# Patient Record
Sex: FEMALE | URBAN - METROPOLITAN AREA
[De-identification: names, ages, dates, MRNs, and addresses within clinical notes are randomized per-mention and may not be internally consistent; named-entity substitution may affect disease eponyms.]

---

## 2022-12-14 ENCOUNTER — CLAIMS CREATED FROM THE CLAIM WINDOW (OUTPATIENT)
Dept: URBAN - METROPOLITAN AREA CLINIC 98 | Facility: CLINIC | Age: 37
End: 2022-12-14
Payer: COMMERCIAL

## 2022-12-14 VITALS
HEIGHT: 61 IN | WEIGHT: 185.8 LBS | DIASTOLIC BLOOD PRESSURE: 102 MMHG | TEMPERATURE: 98.2 F | HEART RATE: 120 BPM | BODY MASS INDEX: 35.08 KG/M2 | SYSTOLIC BLOOD PRESSURE: 114 MMHG

## 2022-12-14 DIAGNOSIS — K76.9 LIVER LESION: ICD-10-CM

## 2022-12-14 DIAGNOSIS — R10.11 RIGHT UPPER QUADRANT PAIN: ICD-10-CM

## 2022-12-14 PROBLEM — 300331000: Status: ACTIVE | Noted: 2022-12-14

## 2022-12-14 PROCEDURE — 99204 OFFICE O/P NEW MOD 45 MIN: CPT | Performed by: INTERNAL MEDICINE

## 2022-12-14 RX ORDER — LEVOTHYROXINE SODIUM 175 UG/1
1 TABLET IN THE MORNING ON AN EMPTY STOMACH TABLET ORAL ONCE A DAY
Status: ACTIVE | COMMUNITY

## 2022-12-14 NOTE — HPI-TODAY'S VISIT:
Ms. Boyle is a 38 yo Georgian-speaking F presenting for new patient visit for abdominal pain.  An  is used for this encounter. ID [id number]   She was seen a a hospital in Feb 2022 for right upper quadrant pain that radiated to her back. She believes she had her gallbladder removed at that time.  She feels her pain worsened after the gallbladder was removed.  Recently was seen at 2 EDs for the pain. CT showed lesion in gallbladder and liver cysts, ultrasound did not show a lesion in gallbladder but recommended MRI.   She has a burning RUQ pain with associated nausea, no vomiting.  No weight los or blood in stool.  No medications help her symptoms. she has not had fever or yellowing of skin or eyes She was given pantoprazole, dicyclomine, zofran, docusate in the hospital  I reviewed: 11/1/22:  H/H 14.1/41 T bili 0.5 AST 28 ALT 38  11/17/22 labs:  alk phos 52 AST 21, ALT 24, t bili 0.6 lipase 38  11/17/22:  Ultrasound: hypoechoic areas in right aspec of liver 3.2x 2.7x2.8 cm, 3.3 x 4.3 x 3.4 cm. Questioned gallbladder calcification seen on CT from same day not identified 12x7 mm ovoid hypodense structure near infundibulum- partially calcified gallstone, aneurysm. 32 mm cyst abutting gallbladder fossa

## 2022-12-14 NOTE — EXAM-FUNCTIONAL ASSESSMENT
Constitutional: well-developed, normal communication ability.   Eyes: Conjunctivae and eyelids appear normal, no scleral icterus. Respiratory: symmetric expansion of chest wall, normal work of breathing   Gastrointestinal:  normoactive bowel sounds, soft, generalized abdominal tenderness, no rebound tenderness, no shifting dullness, no organomegaly.   Musculoskeletal: normal gait and station   Skin: no jaundice   Neurologic: Oriented to person, place, time. Short term memory intact.    Psychiatric: Normal mood and appropriate affect.

## 2022-12-15 LAB — AFP, SERUM, TUMOR MARKER: <1.8

## 2023-01-07 ENCOUNTER — LAB OUTSIDE AN ENCOUNTER (OUTPATIENT)
Dept: URBAN - METROPOLITAN AREA CLINIC 98 | Facility: CLINIC | Age: 38
End: 2023-01-07

## 2023-01-17 ENCOUNTER — DASHBOARD ENCOUNTERS (OUTPATIENT)
Age: 38
End: 2023-01-17

## 2023-01-17 ENCOUNTER — OFFICE VISIT (OUTPATIENT)
Dept: URBAN - METROPOLITAN AREA CLINIC 98 | Facility: CLINIC | Age: 38
End: 2023-01-17
Payer: COMMERCIAL

## 2023-01-17 VITALS
SYSTOLIC BLOOD PRESSURE: 118 MMHG | BODY MASS INDEX: 36.99 KG/M2 | HEART RATE: 85 BPM | DIASTOLIC BLOOD PRESSURE: 70 MMHG | WEIGHT: 188.4 LBS | HEIGHT: 60 IN | TEMPERATURE: 97.9 F

## 2023-01-17 DIAGNOSIS — K80.20 CALCULUS OF GALLBLADDER WITHOUT CHOLECYSTITIS WITHOUT OBSTRUCTION: ICD-10-CM

## 2023-01-17 DIAGNOSIS — R10.11 RUQ ABDOMINAL PAIN: ICD-10-CM

## 2023-01-17 DIAGNOSIS — E66.9 OBESITY (BMI 30.0-34.9): ICD-10-CM

## 2023-01-17 DIAGNOSIS — R93.2 ABNORMAL CT OF LIVER: ICD-10-CM

## 2023-01-17 DIAGNOSIS — K76.0 NAFLD (NONALCOHOLIC FATTY LIVER DISEASE): ICD-10-CM

## 2023-01-17 DIAGNOSIS — K76.89 LIVER CYST: ICD-10-CM

## 2023-01-17 DIAGNOSIS — K21.9 GERD WITHOUT ESOPHAGITIS: ICD-10-CM

## 2023-01-17 PROBLEM — 85057007: Status: ACTIVE | Noted: 2023-01-17

## 2023-01-17 PROBLEM — 70342003: Status: ACTIVE | Noted: 2023-01-17

## 2023-01-17 PROBLEM — 1231824009: Status: ACTIVE | Noted: 2023-01-17

## 2023-01-17 PROBLEM — 443371000124107: Status: ACTIVE | Noted: 2023-01-17

## 2023-01-17 PROCEDURE — 99214 OFFICE O/P EST MOD 30 MIN: CPT | Performed by: INTERNAL MEDICINE

## 2023-01-17 RX ORDER — PANTOPRAZOLE SODIUM 40 MG/1
1 TABLET TABLET, DELAYED RELEASE ORAL ONCE A DAY
Qty: 30 | Refills: 3 | OUTPATIENT
Start: 2023-01-17

## 2023-01-17 RX ORDER — DICYCLOMINE HYDROCHLORIDE 20 MG/1
1 TABLET TABLET ORAL THREE TIMES A DAY
Qty: 30 TABLET | Refills: 1 | OUTPATIENT
Start: 2023-01-17 | End: 2023-02-06

## 2023-01-17 RX ORDER — LEVOTHYROXINE SODIUM 175 UG/1
1 TABLET IN THE MORNING ON AN EMPTY STOMACH TABLET ORAL ONCE A DAY
Status: ACTIVE | COMMUNITY

## 2023-01-17 NOTE — HPI-TODAY'S VISIT:
36 yo pt here for follow up of abdominal pain. She reports beng seen in the ER @ [facility] in 2/22: she reports having CCY at that time, no Hospital records available for reviewing. Since surgery, her RUQ abdominal pain has been more intense and frequent compared to prior to her CCY: pain is rather constant in RUQ, w nocturnal pressure " like a balloon " in the RUQ, w radiation to the lower back, exacerbated by eating, unrelated to the type of food she eats, w concomitant nausea and no emesis. She also reports burning epigastric pain w intermittent heartburn, regurgitation, gas and burping. Chronic alternating loose, non-bloody stools and mild constipation wo melenic stools nor hematochezia. No anorexia or weight loss and o fever, chills nor night sweats. She was seen by Dr. Cook: AFP normal. MRI 1/23: fatty liver, focally dilated bile ducts wo intrahepatic biliary dilatation, gallstones w sludge, and 2.5 cm complex liver cyst. She has had no dysphagia / odynophagia. Mild urinary frequency lately wo hematuria or dysuria.  No COVID-19 exposure and has received COVID-19 vaccine. NO other complaints.

## 2023-01-17 NOTE — PHYSICAL EXAM GASTROINTESTINAL
Abdomen , obese abdomen, soft, nontender, nondistended , no guarding or rigidity , no masses palpable , normal bowel sounds , Liver and Spleen , no hepatomegaly present , no hepatosplenomegaly , liver nontender , spleen not palpable

## 2023-01-24 ENCOUNTER — TELEPHONE ENCOUNTER (OUTPATIENT)
Dept: URBAN - METROPOLITAN AREA CLINIC 98 | Facility: CLINIC | Age: 38
End: 2023-01-24

## 2023-01-26 LAB
A/G RATIO: 1.6
ALBUMIN: 4.5
ALKALINE PHOSPHATASE: 62
ALPHA 2-MACROGLOBULINS, QN: 149
ALT (SGPT) P5P: 28
ALT (SGPT): 24
ANCA BY IFA (RDL): NEGATIVE
APOLIPOPROTEIN A-1: 157
AST (SGOT) P5P: 24
AST (SGOT): 20
BILIRUBIN, TOTAL: 0.3
BILIRUBIN, TOTAL: 0.3
BUN/CREATININE RATIO: 15
BUN: 14
CALCIUM: 9.3
CARBON DIOXIDE, TOTAL: 20
CHLORIDE: 105
CHOLESTEROL, TOTAL: 158
CHOLESTEROL, TOTAL: 162
COMMENT:: (no result)
COMMENT:: (no result)
CREATININE: 0.93
EGFR: 80
FIBROSIS SCORE: 0.05
FIBROSIS SCORING:: (no result)
FIBROSIS STAGE: (no result)
GGT: 34
GLOBULIN, TOTAL: 2.8
GLUCOSE, SERUM: 86
GLUCOSE: 84
HAPTOGLOBIN: 125
HDL CHOLESTEROL: 43
HEIGHT:: 60
HEMATOCRIT: 41.1
HEMOGLOBIN A1C: 4.9
HEMOGLOBIN: 13.3
IGG, IMMUNOGLOBULIN G (RDL): 1261
INTERPRETATIONS:: (no result)
LDL CHOL CALC (NIH): 69
LIMITATIONS:: (no result)
MCH: 30.2
MCHC: 32.4
MCV: 93
MITOCHONDRIAL (M2) ANTIBODY: 157.8
NASH GRADE: (no result)
NASH SCORE: 0.5
NASH SCORING: (no result)
NRBC: (no result)
PLATELETS: 227
POTASSIUM: 4
PROTEIN, TOTAL: 7.3
RBC: 4.4
RDW: 12
SODIUM: 141
STEATOSIS GRADE: (no result)
STEATOSIS GRADING: (no result)
STEATOSIS SCORE: 0.64
T. CHOL/HDL RATIO: 3.7
TRIGLYCERIDES: 290
TRIGLYCERIDES: 303
VLDL CHOLESTEROL CAL: 46
WBC: 5.2
WEIGHT:: 188

## 2023-02-02 ENCOUNTER — TELEPHONE ENCOUNTER (OUTPATIENT)
Dept: URBAN - METROPOLITAN AREA CLINIC 98 | Facility: CLINIC | Age: 38
End: 2023-02-02

## 2023-02-02 RX ORDER — URSODIOL 300 MG/1
1 CAPSULE CAPSULE ORAL TWICE A DAY
Qty: 180 CAPSULE | Refills: 3 | OUTPATIENT
Start: 2023-02-02

## 2023-02-07 ENCOUNTER — TELEPHONE ENCOUNTER (OUTPATIENT)
Dept: URBAN - METROPOLITAN AREA CLINIC 6 | Facility: CLINIC | Age: 38
End: 2023-02-07

## 2023-02-07 ENCOUNTER — TELEPHONE ENCOUNTER (OUTPATIENT)
Dept: URBAN - METROPOLITAN AREA CLINIC 98 | Facility: CLINIC | Age: 38
End: 2023-02-07

## 2023-02-17 PROBLEM — 266435005: Status: ACTIVE | Noted: 2023-01-17

## 2023-02-23 ENCOUNTER — OFFICE VISIT (OUTPATIENT)
Dept: URBAN - METROPOLITAN AREA SURGERY CENTER 18 | Facility: SURGERY CENTER | Age: 38
End: 2023-02-23

## 2023-12-11 ENCOUNTER — OFFICE VISIT (OUTPATIENT)
Dept: URBAN - METROPOLITAN AREA SURGERY CENTER 18 | Facility: SURGERY CENTER | Age: 38
End: 2023-12-11
Payer: COMMERCIAL

## 2023-12-11 DIAGNOSIS — K44.9 HIATAL HERNIA: ICD-10-CM

## 2023-12-11 DIAGNOSIS — K31.9 ERYTHEMA OF GASTRIC ANTRUM: ICD-10-CM

## 2023-12-11 DIAGNOSIS — K29.70 GASTRIC INFLAMMATION: ICD-10-CM

## 2023-12-11 DIAGNOSIS — K29.60 ADENOPAPILLOMATOSIS GASTRICA: ICD-10-CM

## 2023-12-11 DIAGNOSIS — R10.11 ABDOMINAL BURNING SENSATION IN RIGHT UPPER QUADRANT: ICD-10-CM

## 2023-12-11 DIAGNOSIS — K22.89 DILATATION OF ESOPHAGUS: ICD-10-CM

## 2023-12-11 PROCEDURE — G8907 PT DOC NO EVENTS ON DISCHARG: HCPCS | Performed by: INTERNAL MEDICINE

## 2023-12-11 PROCEDURE — 43239 EGD BIOPSY SINGLE/MULTIPLE: CPT | Performed by: INTERNAL MEDICINE

## 2023-12-11 PROCEDURE — 00731 ANES UPR GI NDSC PX NOS: CPT | Performed by: NURSE ANESTHETIST, CERTIFIED REGISTERED

## 2023-12-16 ENCOUNTER — TELEPHONE ENCOUNTER (OUTPATIENT)
Dept: URBAN - METROPOLITAN AREA CLINIC 98 | Facility: CLINIC | Age: 38
End: 2023-12-16

## 2023-12-16 RX ORDER — PANTOPRAZOLE SODIUM 40 MG/1
1 TABLET TABLET, DELAYED RELEASE ORAL ONCE A DAY
Qty: 30 | Refills: 3 | OUTPATIENT
Start: 2023-12-16

## 2024-01-16 ENCOUNTER — TELEPHONE ENCOUNTER (OUTPATIENT)
Dept: URBAN - METROPOLITAN AREA CLINIC 98 | Facility: CLINIC | Age: 39
End: 2024-01-16

## 2024-01-16 RX ORDER — URSODIOL 300 MG/1
1 CAPSULE CAPSULE ORAL TWICE A DAY
Qty: 180 CAPSULE | Refills: 3
Start: 2023-02-02

## 2024-10-01 ENCOUNTER — OFFICE VISIT (OUTPATIENT)
Dept: URBAN - METROPOLITAN AREA CLINIC 98 | Facility: CLINIC | Age: 39
End: 2024-10-01

## 2024-11-26 ENCOUNTER — LAB OUTSIDE AN ENCOUNTER (OUTPATIENT)
Dept: URBAN - METROPOLITAN AREA CLINIC 98 | Facility: CLINIC | Age: 39
End: 2024-11-26

## 2024-11-26 ENCOUNTER — OFFICE VISIT (OUTPATIENT)
Dept: URBAN - METROPOLITAN AREA CLINIC 98 | Facility: CLINIC | Age: 39
End: 2024-11-26
Payer: COMMERCIAL

## 2024-11-26 VITALS
SYSTOLIC BLOOD PRESSURE: 105 MMHG | WEIGHT: 171.6 LBS | HEART RATE: 97 BPM | DIASTOLIC BLOOD PRESSURE: 64 MMHG | HEIGHT: 60 IN | TEMPERATURE: 97.5 F | BODY MASS INDEX: 33.69 KG/M2

## 2024-11-26 DIAGNOSIS — M34.9 SCLERODERMA: ICD-10-CM

## 2024-11-26 DIAGNOSIS — E66.9 OBESITY (BMI 30.0-34.9): ICD-10-CM

## 2024-11-26 DIAGNOSIS — R93.2 ABNORMAL CT OF LIVER: ICD-10-CM

## 2024-11-26 DIAGNOSIS — K76.0 NAFLD (NONALCOHOLIC FATTY LIVER DISEASE): ICD-10-CM

## 2024-11-26 DIAGNOSIS — R10.11 RUQ ABDOMINAL PAIN: ICD-10-CM

## 2024-11-26 DIAGNOSIS — K76.89 LIVER CYST: ICD-10-CM

## 2024-11-26 DIAGNOSIS — K21.9 GERD WITHOUT ESOPHAGITIS: ICD-10-CM

## 2024-11-26 DIAGNOSIS — R76.8 ANTIMITOCHONDRIAL ANTIBODY POSITIVE: ICD-10-CM

## 2024-11-26 PROCEDURE — 99214 OFFICE O/P EST MOD 30 MIN: CPT | Performed by: INTERNAL MEDICINE

## 2024-11-26 RX ORDER — PANTOPRAZOLE SODIUM 40 MG/1
1 TABLET TABLET, DELAYED RELEASE ORAL ONCE A DAY
Qty: 30 | Refills: 3 | OUTPATIENT

## 2024-11-26 RX ORDER — PANTOPRAZOLE SODIUM 40 MG/1
1 TABLET TABLET, DELAYED RELEASE ORAL ONCE A DAY
Qty: 30 | Refills: 3 | Status: ON HOLD | COMMUNITY
Start: 2023-01-17

## 2024-11-26 RX ORDER — PANTOPRAZOLE SODIUM 40 MG/1
1 TABLET TABLET, DELAYED RELEASE ORAL ONCE A DAY
Qty: 30 | Refills: 3 | Status: ON HOLD | COMMUNITY
Start: 2023-12-16

## 2024-11-26 RX ORDER — URSODIOL 300 MG/1
1 CAPSULE CAPSULE ORAL TWICE A DAY
Qty: 180 CAPSULE | Refills: 3 | Status: ON HOLD | COMMUNITY
Start: 2023-02-02

## 2024-11-26 RX ORDER — URSODIOL 300 MG/1
1 CAPSULE CAPSULE ORAL TWICE A DAY
Status: ACTIVE | COMMUNITY
Start: 2024-11-30

## 2024-11-26 RX ORDER — LEVOTHYROXINE SODIUM 175 UG/1
1 TABLET IN THE MORNING ON AN EMPTY STOMACH TABLET ORAL ONCE A DAY
Status: ON HOLD | COMMUNITY

## 2024-11-26 RX ORDER — LEVOTHYROXINE SODIUM 50 UG/1
1 TABLET IN THE MORNING ON AN EMPTY STOMACH TABLET ORAL ONCE A DAY
Status: ACTIVE | COMMUNITY
Start: 2024-11-30

## 2024-11-26 NOTE — HPI-TODAY'S VISIT:
38 yo pt w Hx NERD - HH, Hypothyroidism  s/p CCY for gallstones / biliary sludge, AMA + 157.8 without PBC clinically on Ursodiol 300 mg po bid,  Dx'd 4 mos ago w Scleroderma  on Plaquenil 200 mg po qd, being followed by Dr. Segura from Rheumatology, here for follow up of abdominal pain. She was seen in the ER @ [facility] 9/24 for arthralgias and LBP: normal labs, EKG and treated w a course of prednisone and NSAIDs. Since her CCY, intermittent RUQ  burning pain, at times like pressure -sharp wo radiation and wo N/V, not clearly related to eating or type of food she eats. She also reports burning epigastric pain w intermittent heartburn, regurgitation, gas and burping. Chronic alternating loose, non-bloody stools and mild constipation wo melenic stools nor hematochezia. No anorexia or weight loss. Mils anorexia. Recent labs w Rheumatology, no copy available. MRI 1/24, prior to her CCY: moderate fatty liver, focally dilated bile ducts wo intrahepatic biliary dilatation, gallstones w sludge, and 2.5 cm complex liver cyst. EGD 12/23: NERD, sm HH and Hp-negative mild gastritis.  Labs 1/23: , VLDL 46, LDL 69, .8 w normal CMP, IgG, pANCA, AFP. She has had no dysphagia / odynophagia. Mild urinary frequency lately wo hematuria or dysuria. NO other complaints.

## 2024-11-30 PROBLEM — 267874003: Status: ACTIVE | Noted: 2024-11-30

## 2024-12-03 ENCOUNTER — OFFICE VISIT (OUTPATIENT)
Dept: URBAN - METROPOLITAN AREA CLINIC 97 | Facility: CLINIC | Age: 39
End: 2024-12-03
Payer: COMMERCIAL

## 2024-12-03 DIAGNOSIS — R10.11 RUQ ABDOMINAL PAIN: ICD-10-CM

## 2024-12-03 PROCEDURE — 76705 ECHO EXAM OF ABDOMEN: CPT | Performed by: INTERNAL MEDICINE

## 2024-12-16 ENCOUNTER — TELEPHONE ENCOUNTER (OUTPATIENT)
Dept: URBAN - METROPOLITAN AREA CLINIC 98 | Facility: CLINIC | Age: 39
End: 2024-12-16

## 2024-12-16 RX ORDER — URSODIOL 300 MG/1
1 CAPSULE CAPSULE ORAL TWICE A DAY
Qty: 180 CAPSULE | Refills: 3
Start: 2024-11-30

## 2025-01-03 ENCOUNTER — OFFICE VISIT (OUTPATIENT)
Dept: URBAN - METROPOLITAN AREA TELEHEALTH 2 | Facility: TELEHEALTH | Age: 40
End: 2025-01-03
Payer: COMMERCIAL

## 2025-01-03 DIAGNOSIS — K21.9 GERD WITHOUT ESOPHAGITIS: ICD-10-CM

## 2025-01-03 DIAGNOSIS — R76.8 ANTIMITOCHONDRIAL ANTIBODY POSITIVE: ICD-10-CM

## 2025-01-03 DIAGNOSIS — R93.2 ABNORMAL CT OF LIVER: ICD-10-CM

## 2025-01-03 DIAGNOSIS — M34.9 SCLERODERMA: ICD-10-CM

## 2025-01-03 DIAGNOSIS — K76.89 LIVER CYST: ICD-10-CM

## 2025-01-03 DIAGNOSIS — K76.0 NAFLD (NONALCOHOLIC FATTY LIVER DISEASE): ICD-10-CM

## 2025-01-03 DIAGNOSIS — R10.11 RUQ ABDOMINAL PAIN: ICD-10-CM

## 2025-01-03 PROCEDURE — 99214 OFFICE O/P EST MOD 30 MIN: CPT | Performed by: INTERNAL MEDICINE

## 2025-01-03 RX ORDER — URSODIOL 300 MG/1
1 CAPSULE CAPSULE ORAL TWICE A DAY
Qty: 180 CAPSULE | Refills: 3 | Status: ACTIVE | COMMUNITY
Start: 2024-11-30

## 2025-01-03 RX ORDER — PANTOPRAZOLE SODIUM 40 MG/1
1 TABLET TABLET, DELAYED RELEASE ORAL ONCE A DAY
Qty: 30 | Refills: 3 | Status: ON HOLD | COMMUNITY
Start: 2023-12-16

## 2025-01-03 RX ORDER — LEVOTHYROXINE SODIUM 50 UG/1
1 TABLET IN THE MORNING ON AN EMPTY STOMACH TABLET ORAL ONCE A DAY
Status: ACTIVE | COMMUNITY
Start: 2024-11-30

## 2025-01-03 RX ORDER — PANTOPRAZOLE SODIUM 40 MG/1
1 TABLET TABLET, DELAYED RELEASE ORAL ONCE A DAY
Qty: 30 | Refills: 3 | Status: ACTIVE | COMMUNITY

## 2025-01-03 RX ORDER — LEVOTHYROXINE SODIUM 175 UG/1
1 TABLET IN THE MORNING ON AN EMPTY STOMACH TABLET ORAL ONCE A DAY
Status: ON HOLD | COMMUNITY

## 2025-01-03 RX ORDER — URSODIOL 300 MG/1
1 CAPSULE CAPSULE ORAL TWICE A DAY
Qty: 180 CAPSULE | Refills: 3 | Status: ON HOLD | COMMUNITY
Start: 2023-02-02

## 2025-01-03 RX ORDER — PANTOPRAZOLE SODIUM 40 MG/1
1 TABLET TABLET, DELAYED RELEASE ORAL ONCE A DAY
Qty: 30 | Refills: 3 | OUTPATIENT

## 2025-01-03 NOTE — HPI-TODAY'S VISIT:
This is a Telehealth OV to which patient has agreed to. Limitations of TH discussed; patient understands and agrees to proceed. Pt's at home during this virtual OV. 40 yo pt w Hx NERD - HH, Hypothyroidism  s/p CCY for gallstones / biliary sludge, AMA + 157.8 without PBC clinically on Ursodiol 300 mg po bid,  Dx'd 4 mos ago w Scleroderma  on Plaquenil 200 mg po qd, being followed by Dr. Segura from Rheumatology, here for follow up of abdominal pain. Seen in the ER @ [facility] 9/24 for arthralgias and LBP: normal labs, EKG and treated w a course of prednisone and NSAIDs. Since her CCY, intermittent RUQ  burning pain, at times like pressure -sharp wo radiation and wo N/V, not clearly related to eating or type of food she eats. Pain somewhat better. RUQ-US 11/24: normal liver, s/p CCY, otherwise normal.  She also reports burning epigastric pain w intermittent heartburn, regurgitation, gas and burping. Chronic alternating loose, non-bloody stools and mild constipation wo melenic stools nor hematochezia. No anorexia or weight loss. Recent labs w Rheumatology, normal as per patient's report. MRI 1/24, prior to her CCY: moderate fatty liver, focally dilated bile ducts wo intrahepatic biliary dilatation, gallstones w sludge, and 2.5 cm complex liver cyst. EGD 12/23: NERD, sm HH and Hp-negative mild gastritis.  Labs 1/23: , VLDL 46, LDL 69, .8 w normal CMP, IgG, pANCA, AFP. She has had no dysphagia / odynophagia.  NO other complaints.

## 2025-01-05 ENCOUNTER — LAB OUTSIDE AN ENCOUNTER (OUTPATIENT)
Dept: URBAN - METROPOLITAN AREA TELEHEALTH 2 | Facility: TELEHEALTH | Age: 40
End: 2025-01-05

## 2025-01-09 ENCOUNTER — TELEPHONE ENCOUNTER (OUTPATIENT)
Dept: URBAN - METROPOLITAN AREA CLINIC 98 | Facility: CLINIC | Age: 40
End: 2025-01-09

## 2025-01-27 ENCOUNTER — ERX REFILL RESPONSE (OUTPATIENT)
Dept: URBAN - METROPOLITAN AREA CLINIC 98 | Facility: CLINIC | Age: 40
End: 2025-01-27

## 2025-01-27 RX ORDER — URSODIOL 300 MG/1
TAKE 1 CAPSULE BY MOUTH TWICE DAILY FOR 90 DAYS CAPSULE ORAL
Qty: 180 CAPSULE | Refills: 0 | OUTPATIENT

## 2025-01-27 RX ORDER — URSODIOL 300 MG/1
TAKE 1 CAPSULE BY MOUTH TWICE DAILY FOR 90 DAYS CAPSULE ORAL
Qty: 180 CAPSULE | Refills: 1 | OUTPATIENT

## 2025-02-27 ENCOUNTER — TELEPHONE ENCOUNTER (OUTPATIENT)
Dept: URBAN - METROPOLITAN AREA CLINIC 98 | Facility: CLINIC | Age: 40
End: 2025-02-27

## 2025-05-04 ENCOUNTER — ERX REFILL RESPONSE (OUTPATIENT)
Dept: URBAN - METROPOLITAN AREA CLINIC 98 | Facility: CLINIC | Age: 40
End: 2025-05-04

## 2025-05-04 RX ORDER — URSODIOL 300 MG/1
TAKE 1 CAPSULE BY MOUTH TWICE DAILY FOR 90 DAYS CAPSULE ORAL
Qty: 180 CAPSULE | Refills: 0

## 2025-07-15 ENCOUNTER — OFFICE VISIT (OUTPATIENT)
Dept: URBAN - METROPOLITAN AREA TELEHEALTH 2 | Facility: TELEHEALTH | Age: 40
End: 2025-07-15
Payer: COMMERCIAL

## 2025-07-15 DIAGNOSIS — K76.0 NAFLD (NONALCOHOLIC FATTY LIVER DISEASE): ICD-10-CM

## 2025-07-15 DIAGNOSIS — E66.811 CLASS 1 OBESITY: ICD-10-CM

## 2025-07-15 DIAGNOSIS — R76.8 ANTIMITOCHONDRIAL ANTIBODY POSITIVE: ICD-10-CM

## 2025-07-15 DIAGNOSIS — K21.9 GERD WITHOUT ESOPHAGITIS: ICD-10-CM

## 2025-07-15 DIAGNOSIS — M34.9 SCLERODERMA: ICD-10-CM

## 2025-07-15 DIAGNOSIS — K76.89 LIVER CYST: ICD-10-CM

## 2025-07-15 DIAGNOSIS — R93.2 ABNORMAL CT OF LIVER: ICD-10-CM

## 2025-07-15 DIAGNOSIS — R10.11 RUQ ABDOMINAL PAIN: ICD-10-CM

## 2025-07-15 PROCEDURE — 99214 OFFICE O/P EST MOD 30 MIN: CPT | Performed by: INTERNAL MEDICINE

## 2025-07-15 RX ORDER — PANTOPRAZOLE SODIUM 40 MG/1
1 TABLET TABLET, DELAYED RELEASE ORAL ONCE A DAY
Qty: 30 | Refills: 3 | OUTPATIENT
Start: 2025-07-28

## 2025-07-15 RX ORDER — LEVOTHYROXINE SODIUM 50 UG/1
1 TABLET IN THE MORNING ON AN EMPTY STOMACH TABLET ORAL ONCE A DAY
Status: ACTIVE | COMMUNITY
Start: 2024-11-30

## 2025-07-15 RX ORDER — URSODIOL 300 MG/1
1 CAPSULE CAPSULE ORAL TWICE A DAY
Qty: 60 | Refills: 3 | OUTPATIENT
Start: 2025-07-28

## 2025-07-15 RX ORDER — LEVOTHYROXINE SODIUM 175 UG/1
1 TABLET IN THE MORNING ON AN EMPTY STOMACH TABLET ORAL ONCE A DAY
Status: ON HOLD | COMMUNITY

## 2025-07-15 RX ORDER — URSODIOL 300 MG/1
TAKE 1 CAPSULE BY MOUTH TWICE DAILY FOR 90 DAYS CAPSULE ORAL
Qty: 180 CAPSULE | Refills: 0 | Status: ACTIVE | COMMUNITY

## 2025-07-15 RX ORDER — PANTOPRAZOLE SODIUM 40 MG/1
1 TABLET TABLET, DELAYED RELEASE ORAL ONCE A DAY
Qty: 30 | Refills: 3 | Status: ACTIVE | COMMUNITY

## 2025-07-15 RX ORDER — PANTOPRAZOLE SODIUM 40 MG/1
1 TABLET TABLET, DELAYED RELEASE ORAL ONCE A DAY
Qty: 30 | Refills: 3 | Status: ON HOLD | COMMUNITY
Start: 2023-12-16

## 2025-07-15 NOTE — HPI-TODAY'S VISIT:
This is a Telehealth OV to which patient has agreed to. Limitations of TH discussed; patient understands and agrees to proceed. Pt's at home during this virtual OV. 38 yo pt w Hx NERD - HH, Hypothyroidism  s/p CCY for gallstones / biliary sludge, AMA + 157.8 without PBC clinically on Ursodiol 300 mg po bid,  Dx'd 4 mos ago w Scleroderma  on Plaquenil 200 mg po qd, being followed by Dr. Segura from Rheumatology, here for follow up of abdominal pain. Seen in the ER @ [facility] 9/24 for arthralgias and LBP: normal labs, EKG and treated w a course of prednisone and NSAIDs. Since her CCY, intermittent RUQ  burning pain, at times like pressure -sharp wo radiation and wo N/V, not clearly related to eating or type of food she eats. Pain somewhat better. MRCP 1/25: resolved GB fossa p-op seroma, liver cyst, s/p CCY and normal biliary system. RUQ-US 11/24: normal liver, s/p CCY, otherwise normal.  She also reports burning epigastric pain w intermittent heartburn, regurgitation, gas, burping, abdominal bloating and distention, wo nausea or emesis Has been on Pantoprazole 40 mg po qd. . Chronic alternating loose, non-bloody stools and mild constipation wo melenic stools nor hematochezia. No anorexia or weight loss. Recent labs w Rheumatology, normal as per patient's report. MRI 1/24, prior to her CCY: moderate fatty liver, focally dilated bile ducts wo intrahepatic biliary dilatation, gallstones w sludge, and 2.5 cm complex liver cyst. EGD 12/23: NERD, sm HH and Hp-negative mild gastritis.  Labs 1/23: , VLDL 46, LDL 69, .8 w normal CMP, IgG, pANCA, AFP. She has had no dysphagia / odynophagia.  NO other complaints.

## 2025-07-28 ENCOUNTER — LAB OUTSIDE AN ENCOUNTER (OUTPATIENT)
Dept: URBAN - METROPOLITAN AREA TELEHEALTH 2 | Facility: TELEHEALTH | Age: 40
End: 2025-07-28

## 2025-08-18 ENCOUNTER — OFFICE VISIT (OUTPATIENT)
Dept: URBAN - METROPOLITAN AREA CLINIC 97 | Facility: CLINIC | Age: 40
End: 2025-08-18